# Patient Record
Sex: MALE | URBAN - METROPOLITAN AREA
[De-identification: names, ages, dates, MRNs, and addresses within clinical notes are randomized per-mention and may not be internally consistent; named-entity substitution may affect disease eponyms.]

---

## 2023-10-18 ENCOUNTER — ATHLETIC TRAINING (OUTPATIENT)
Dept: SPORTS MEDICINE | Facility: OTHER | Age: 18
End: 2023-10-18

## 2023-10-18 DIAGNOSIS — S76.311A STRAIN OF RIGHT HAMSTRING MUSCLE, INITIAL ENCOUNTER: Primary | ICD-10-CM

## 2023-10-19 ENCOUNTER — ATHLETIC TRAINING (OUTPATIENT)
Dept: SPORTS MEDICINE | Facility: OTHER | Age: 18
End: 2023-10-19

## 2023-10-19 DIAGNOSIS — S76.311A STRAIN OF RIGHT HAMSTRING MUSCLE, INITIAL ENCOUNTER: Primary | ICD-10-CM

## 2023-10-19 NOTE — PROGRESS NOTES
Athletic Training Hip/Thigh Evaluation     Name: Snehal Vieira  Age: 19 y.o.   Mady Picking: Nhan   Sport: Baseball  Date of Assessment: 10/18/2023     Assessment/Plan:      Visit Diagnosis: Strain of right hamstring muscle, initial encounter [H32.017O]     Treatment Plan: Patient will undergo rehabilitation of R upper leg for hamstring strain. Patient will report to  daily for rehabilitation. Patient will be withheld from baseball activities until further notice (at least 1-2 weeks). []  Follow-up PRN. []  Follow-up prior to next practice/game for re-evaluation. [x]  Daily treatment/rehab. Progress note expected weekly. Referral:      [x]  Not needed at this time  []  Referred to:      [x]  Coaching staff notified  []  Parent/Guardian Notified      Subjective:     Date of Injury: 10/17/2023     Injury occurred during:      [x]  Practice  []  Competition  []  Other:      Mechanism: Running    Previous History: Previous hx of hamstring strain to right upper leg last year. Patient did not complete formal rehabilitation/receive evaluation from  at previous college, simply "dealt" with the pain during his previous baseball season.       Reported Symptoms:      [x] Pain with rest [] Pressure   [x] Pain with activity [] Burning   [x] Pain with stairs [] Weakness   [] Sharp pain [] Loss of motion   [x] Dull pain [] Clicking   [x] Felt pop [] Snapping sensation   [] Felt give way [] Radiating pain   [] Grinding          Objective:    Observation:      []  No observable findings compared bilaterally     [x] Swelling [] Genu recurvatum   [] Deformity [] Genu valgum   [] Ecchymosis [] Genu varus   [] Abnormal gait [] Hip anteversion   [] Atrophy [] Hip retroversion   [] Muscle spasm [] Patella abnormality   [] Spine curvature          Palpation: TTP along muscle belly of biceps femoris      Active Range of Motion:        Full  ROM Limited  ROM Pain  with  ROM No  Motion   Hip Flexion  [x] [] [] []   Hip Extension [] [x] [x] []   Hip Abduction [x] [] [] []   Hip Adduction [x] [] [] []   Hip Internal Rotation [x] [] [] []   Hip External Rotation [x] [] [] []   Knee Flexion [x] [] [x] []   Knee Extension [x] [] [] []      Manual Muscle Tests:      Not performed []                     5 4+ 4 4- 3 or  Under   Hip Flexion  [] [x] [] [] []   Hip Extension [] [] [] [x] []   Hip Abduction [] [x] [] [] []   Hip Adduction [] [x] [] [] []   Hip Internal Rotation [x] [] [] [] []   Hip External Rotation [] [x] [] [] []   Knee Flexion [] [] [x] [] []   Knee Extension [x] [] [] [] []      Special Tests:        (+)  Tightness (+)  Pain (-)  WNL Not  Tested   Fulcrum [] [] [x] []   Ely’s [] [] [x] []   Eudelia Alegre [] [] [x] []   Kanu (Modified Denysdelia Alegre) [] [] [x] []   [place] []  [] [x] []   Piriformis [] [] [x] []   MACIEJ [] [] [x] []   FADIR [] [] [x] []   SI Compression/Distraction [] [] [x] []     (+)  Clicking (+)  Pain (-)  WNL Not  Tested   Hip Scour [] [] [x] []     (+)  POS   (-)  WNL Not  Tested   Long Sit Test [] Leg  Discrepancy [x] []   Trendelenberg's  [] Pelvic  Drop [x] []       Treatment Log:       Date:  10/19   Playing Status:  OUT          Exercise/Treatment      AROM Knee flexion (prone) w/ cupping 2x10 (w/ 2lb CW)    SLR  2x10 (w/ 2lb CW)    Prone hip extension --    Isometric hip extension (into wall) x10   Sidelying hip abduction 2x10    sidelying hip adduction 2x10    3-way sliders  2x10 (flexion, extension, and abduction)    nerve glide (supine & sitting) 2x10

## 2023-10-19 NOTE — PROGRESS NOTES
Athletic Training Progress Note    Name: Margo Hernandez  Age: 19 y.o. Assessment/Plan:     Visit Diagnosis: No primary diagnosis found. Treatment Plan:     []  Follow-up PRN. []  Follow-up prior to next practice/game for re-evaluation. [x]  Daily treatment/rehab. Progress note expected weekly. Subjective: Patient reports that his pain in R upper leg has decreased in comparison to last visit. Initially, he rated his pain a 6/10 at worst and reported he had difficulty falling asleep due to his pain. After completion of rehabilitation yesterday, he reported his pain decreased to a 5/10 and was able to "walk better" than he did before reporting to [facility]. He also reported he was able to fall asleep without difficulties.      Objective:   See treatment log below    Treatment Log:     Date: 10/19       Playing Status: OUT                Exercise/Treatment        AROM Knee flexion w/ cupping (prone) 2x10       SLR  2x10 (w/ 2lbs)       Prone hip extension  X5 (body weight)       Isometric hip extension  2x10        Sidelying hip abduction  2x10 (w/ 2lbs)       Sidelying hip adduction 2x10 (w/ 2lbs)       Nerve glide (sitting/supine) 2x10       Standing nerve glide (w/ trunk flexion) 2x10       3-way sliders  2x10 (flexion, extension, abduction)       Glute Bridge w/ heels up x10                 Date:  10/19   Playing Status:  OUT          Exercise/Treatment      AROM Knee flexion (prone) w/ cupping 2x10 (w/ 2lb CW)    SLR  2x10 (w/ 2lb CW)    Prone hip extension --    Isometric hip extension (into wall) x10   Sidelying hip abduction 2x10    sidelying hip adduction 2x10    3-way sliders  2x10 (flexion, extension, and abduction)    nerve glide (supine & sitting) 2x10

## 2023-10-20 ENCOUNTER — ATHLETIC TRAINING (OUTPATIENT)
Dept: SPORTS MEDICINE | Facility: OTHER | Age: 18
End: 2023-10-20

## 2023-10-20 DIAGNOSIS — S76.311A STRAIN OF RIGHT HAMSTRING MUSCLE, INITIAL ENCOUNTER: Primary | ICD-10-CM

## 2023-10-20 NOTE — PROGRESS NOTES
Athletic Training Progress Note    Name: Dionisio Hugo  Age: 19 y.o. Assessment/Plan:     Visit Diagnosis: Strain of right hamstring muscle, initial encounter [V24.818F]    Treatment Plan: Patient will continue to progress rehabilitative exercises as tolerated. []  Follow-up PRN. []  Follow-up prior to next practice/game for re-evaluation. [x]  Daily treatment/rehab. Progress note expected weekly. Subjective: Patient reported to  for rehabilitation/injury check in. Patient reports his pain in his upper leg as decreased to a 4/10 at worst and reports that it is easier to walk in comparison to yesterday. Patient is eager in continuing rehabilitative process.      Objective:   See treatment log below    Treatment Log:    Date: 10/20       Playing Status: OUT               Exercise/Treatment        AROM Kne flexion w/ cupping 3x10 (3lbs)       Nerve Glide (supine, sitting, standing) 3x10       Hip Hinge stretch w/ towel behind back  NV        SLR w/ 2lbs 2x10       Sidelying hip abduction  2x10       Sidelying hip adduction  2x10       Prone hip extensions x10       Isometric hip extensions  2x10       3-way sliders 2x10       Glute bridge w/ heels up  x10                 Date: 10/19       Playing Status: OUT                Exercise/Treatment        AROM Knee flexion w/ cupping (prone) 2x10       SLR  2x10 (w/ 2lbs)       Prone hip extension  X5 (body weight)       Isometric hip extension  2x10        Sidelying hip abduction  2x10 (w/ 2lbs)       Sidelying hip adduction 2x10 (w/ 2lbs)       Nerve glide (sitting/supine) 2x10       Standing nerve glide (w/ trunk flexion) 2x10       3-way sliders  2x10 (flexion, extension, abduction)       Glute Bridge w/ heels up x10                 Date:  10/19   Playing Status:  OUT          Exercise/Treatment      AROM Knee flexion (prone) w/ cupping 2x10 (w/ 2lb CW)    SLR  2x10 (w/ 2lb CW)    Prone hip extension --    Isometric hip extension (into wall) x10   Sidelying hip abduction 2x10    sidelying hip adduction 2x10    3-way sliders  2x10 (flexion, extension, and abduction)    nerve glide (supine & sitting) 2x10

## 2023-10-23 ENCOUNTER — ATHLETIC TRAINING (OUTPATIENT)
Dept: SPORTS MEDICINE | Facility: OTHER | Age: 18
End: 2023-10-23

## 2023-10-23 DIAGNOSIS — S76.311A STRAIN OF RIGHT HAMSTRING MUSCLE, INITIAL ENCOUNTER: Primary | ICD-10-CM

## 2023-10-23 NOTE — PROGRESS NOTES
Athletic Training Progress Note    Name: Vitor Yap  Age: 19 y.o. Assessment/Plan:     Visit Diagnosis: Strain of right hamstring muscle, initial encounter [S71.085M]    Treatment Plan: Progress exercises as tolerated. []  Follow-up PRN. []  Follow-up prior to next practice/game for re-evaluation. [x]  Daily treatment/rehab. Progress note expected weekly. Subjective: Patient reported to  for check in & rehabilitation of R hamstring. Patient reports a slight decrease in pain in upper leg in comparison to last visit. He reports that pain at worst is a 4/10, at best 1/10 while at rest. He also reports that walking sometimes feels "weird" as it will feel "good" sometimes but then he he takes a step too far, he'll feel his R hamstring "cramp". Patient is eager to continue rehabilitative process.      Objective:   See treatment log below    Treatment Log:    Date: 10/23       Playing Status: OUT                Exercise/Treatment        Glute bridge w/ toes up 2x10       DL RDL w/ 10lbs  x10       Hamstring curl w/ yoga ball x10       Nerve glides (supine, sitting, standing)  3x10       3-way sliders 3x10        4-way hip w/ cable  2x10 (flexion, extension, abduction, adduction)        Seated hamstring curl  X10 (10lbs)                                          Date: 10/19       Playing Status: OUT                Exercise/Treatment        AROM Knee flexion w/ cupping (prone) 2x10       SLR  2x10 (w/ 2lbs)       Prone hip extension  X5 (body weight)       Isometric hip extension  2x10        Sidelying hip abduction  2x10 (w/ 2lbs)       Sidelying hip adduction 2x10 (w/ 2lbs)       Nerve glide (sitting/supine) 2x10       Standing nerve glide (w/ trunk flexion) 2x10       3-way sliders  2x10 (flexion, extension, abduction)       Glute Bridge w/ heels up x10                 Date:  10/19   Playing Status:  OUT          Exercise/Treatment      AROM Knee flexion (prone) w/ cupping 2x10 (w/ 2lb CW)    SLR  2x10 (w/ 2lb CW)    Prone hip extension --    Isometric hip extension (into wall) x10   Sidelying hip abduction 2x10    sidelying hip adduction 2x10    3-way sliders  2x10 (flexion, extension, and abduction)    nerve glide (supine & sitting) 2x10

## 2023-10-24 ENCOUNTER — ATHLETIC TRAINING (OUTPATIENT)
Dept: SPORTS MEDICINE | Facility: OTHER | Age: 18
End: 2023-10-24

## 2023-10-24 DIAGNOSIS — S76.311A STRAIN OF RIGHT HAMSTRING MUSCLE, INITIAL ENCOUNTER: Primary | ICD-10-CM

## 2023-10-25 ENCOUNTER — ATHLETIC TRAINING (OUTPATIENT)
Dept: SPORTS MEDICINE | Facility: OTHER | Age: 18
End: 2023-10-25

## 2023-10-25 DIAGNOSIS — S76.311A STRAIN OF RIGHT HAMSTRING MUSCLE, INITIAL ENCOUNTER: Primary | ICD-10-CM

## 2023-10-25 NOTE — PROGRESS NOTES
Athletic Training Progress Note    Name: Darius Mark  Age: 19 y.o. Assessment/Plan:     Visit Diagnosis: Strain of right hamstring muscle, initial encounter [K33.848U]    Treatment Plan: Patient will progress exercises as tolerated. Patient will report to  10/25 for mobility work only. []  Follow-up PRN. []  Follow-up prior to next practice/game for re-evaluation. [x]  Daily treatment/rehab. Progress note expected weekly. Subjective: Patient reported to  for rehabilitation and injury check in for R hamstring strain. Patient reports his pain feeling the "same" as yesterday.  However, he noted that the completion of his rehabilitative exercises, he felt "better" and noted his pain decreased to a 3/10 at worst.     Objective:   See treatment log below    Treatment Log:    Date: 10/25       Playing Status: OUT                Exercise/Treatment        Glute Bridge w/ toes up  x10       DL RDL  x10       Hamstring curl w/ yoga ball x10       3-way sliders  3x10       4-way hip (on cable machine) 2x10        Seated hamstring curl machine (cable)  X10 (10lbs)        Nerve Glides (supine, sitting, standing) 3x10       Hip Hinge w/ towel stretch X10                                  Date: 10/19       Playing Status: OUT                Exercise/Treatment        AROM Knee flexion w/ cupping (prone) 2x10       SLR  2x10 (w/ 2lbs)       Prone hip extension  X5 (body weight)       Isometric hip extension  2x10        Sidelying hip abduction  2x10 (w/ 2lbs)       Sidelying hip adduction 2x10 (w/ 2lbs)       Nerve glide (sitting/supine) 2x10       Standing nerve glide (w/ trunk flexion) 2x10       3-way sliders  2x10 (flexion, extension, abduction)       Glute Bridge w/ heels up x10                 Date:  10/19   Playing Status:  OUT          Exercise/Treatment      AROM Knee flexion (prone) w/ cupping 2x10 (w/ 2lb CW)    SLR  2x10 (w/ 2lb CW)    Prone hip extension --    Isometric hip extension (into wall) x10   Sidelying hip abduction 2x10    sidelying hip adduction 2x10    3-way sliders  2x10 (flexion, extension, and abduction)    nerve glide (supine & sitting) 2x10

## 2023-10-25 NOTE — PROGRESS NOTES
Athletic Training Progress Note    Name: Jf Zafar  Age: 19 y.o. Assessment/Plan:     Visit Diagnosis: Strain of right hamstring muscle, initial encounter [O58.131O]    Treatment Plan: Patient will progress exercises as tolerated. Patient will be re-evaluated 10/26 to determine next steps in treatment. []  Follow-up PRN. []  Follow-up prior to next practice/game for re-evaluation. [x]  Daily treatment/rehab. Progress note expected weekly. Subjective: Patient reported to  for rehabilitation and injury check in. Patient reports that "this is the best he's felt" since his injury.      Objective:   See treatment log below    Treatment Log:    Date: 10/25       Playing Status: OUT- re-eval 10/30               Exercise/Treatment        Nerve Glides (supine, standing, sitting) 3x10       Hip Hinge w/ towel stretch 3x10       AROM knee flexion w/ cupping 3x10                                                                         Date: 10/19       Playing Status: OUT                Exercise/Treatment        AROM Knee flexion w/ cupping (prone) 2x10       SLR  2x10 (w/ 2lbs)       Prone hip extension  X5 (body weight)       Isometric hip extension  2x10        Sidelying hip abduction  2x10 (w/ 2lbs)       Sidelying hip adduction 2x10 (w/ 2lbs)       Nerve glide (sitting/supine) 2x10       Standing nerve glide (w/ trunk flexion) 2x10       3-way sliders  2x10 (flexion, extension, abduction)       Glute Bridge w/ heels up x10                 Date:  10/19   Playing Status:  OUT          Exercise/Treatment      AROM Knee flexion (prone) w/ cupping 2x10 (w/ 2lb CW)    SLR  2x10 (w/ 2lb CW)    Prone hip extension --    Isometric hip extension (into wall) x10   Sidelying hip abduction 2x10    sidelying hip adduction 2x10    3-way sliders  2x10 (flexion, extension, and abduction)    nerve glide (supine & sitting) 2x10

## 2023-11-08 ENCOUNTER — APPOINTMENT (OUTPATIENT)
Dept: LAB | Age: 18
End: 2023-11-08
Payer: COMMERCIAL

## 2023-11-08 DIAGNOSIS — J02.9 ACUTE PHARYNGITIS, UNSPECIFIED ETIOLOGY: ICD-10-CM

## 2023-11-08 LAB
ALBUMIN SERPL BCP-MCNC: 4.3 G/DL (ref 3.5–5)
ALP SERPL-CCNC: 60 U/L (ref 34–104)
ALT SERPL W P-5'-P-CCNC: 17 U/L (ref 7–52)
ANION GAP SERPL CALCULATED.3IONS-SCNC: 8 MMOL/L
AST SERPL W P-5'-P-CCNC: 18 U/L (ref 13–39)
BASOPHILS # BLD AUTO: 0.04 THOUSANDS/ÂΜL (ref 0–0.1)
BASOPHILS NFR BLD AUTO: 1 % (ref 0–1)
BILIRUB SERPL-MCNC: 0.5 MG/DL (ref 0.2–1)
BUN SERPL-MCNC: 11 MG/DL (ref 5–25)
CALCIUM SERPL-MCNC: 9.4 MG/DL (ref 8.4–10.2)
CHLORIDE SERPL-SCNC: 101 MMOL/L (ref 96–108)
CO2 SERPL-SCNC: 29 MMOL/L (ref 21–32)
CREAT SERPL-MCNC: 0.88 MG/DL (ref 0.6–1.3)
EOSINOPHIL # BLD AUTO: 0.07 THOUSAND/ÂΜL (ref 0–0.61)
EOSINOPHIL NFR BLD AUTO: 1 % (ref 0–6)
ERYTHROCYTE [DISTWIDTH] IN BLOOD BY AUTOMATED COUNT: 12.3 % (ref 11.6–15.1)
GFR SERPL CREATININE-BSD FRML MDRD: 123 ML/MIN/1.73SQ M
GLUCOSE SERPL-MCNC: 97 MG/DL (ref 65–140)
HCT VFR BLD AUTO: 43.7 % (ref 36.5–49.3)
HGB BLD-MCNC: 14.9 G/DL (ref 12–17)
IMM GRANULOCYTES # BLD AUTO: 0.02 THOUSAND/UL (ref 0–0.2)
IMM GRANULOCYTES NFR BLD AUTO: 0 % (ref 0–2)
LYMPHOCYTES # BLD AUTO: 1.24 THOUSANDS/ÂΜL (ref 0.6–4.47)
LYMPHOCYTES NFR BLD AUTO: 18 % (ref 14–44)
MCH RBC QN AUTO: 30.8 PG (ref 26.8–34.3)
MCHC RBC AUTO-ENTMCNC: 34.1 G/DL (ref 31.4–37.4)
MCV RBC AUTO: 90 FL (ref 82–98)
MONOCYTES # BLD AUTO: 0.74 THOUSAND/ÂΜL (ref 0.17–1.22)
MONOCYTES NFR BLD AUTO: 11 % (ref 4–12)
NEUTROPHILS # BLD AUTO: 4.8 THOUSANDS/ÂΜL (ref 1.85–7.62)
NEUTS SEG NFR BLD AUTO: 69 % (ref 43–75)
NRBC BLD AUTO-RTO: 0 /100 WBCS
PLATELET # BLD AUTO: 206 THOUSANDS/UL (ref 149–390)
PMV BLD AUTO: 10 FL (ref 8.9–12.7)
POTASSIUM SERPL-SCNC: 4 MMOL/L (ref 3.5–5.3)
PROT SERPL-MCNC: 7.1 G/DL (ref 6.4–8.4)
RBC # BLD AUTO: 4.84 MILLION/UL (ref 3.88–5.62)
SODIUM SERPL-SCNC: 138 MMOL/L (ref 135–147)
WBC # BLD AUTO: 6.91 THOUSAND/UL (ref 4.31–10.16)

## 2023-11-08 PROCEDURE — 85025 COMPLETE CBC W/AUTO DIFF WBC: CPT

## 2023-11-08 PROCEDURE — 36415 COLL VENOUS BLD VENIPUNCTURE: CPT

## 2023-11-08 PROCEDURE — 86664 EPSTEIN-BARR NUCLEAR ANTIGEN: CPT

## 2023-11-08 PROCEDURE — 80053 COMPREHEN METABOLIC PANEL: CPT

## 2023-11-08 PROCEDURE — 86665 EPSTEIN-BARR CAPSID VCA: CPT

## 2023-11-08 PROCEDURE — 87070 CULTURE OTHR SPECIMN AEROBIC: CPT | Performed by: PHYSICIAN ASSISTANT

## 2023-11-08 PROCEDURE — 86663 EPSTEIN-BARR ANTIBODY: CPT

## 2023-11-09 ENCOUNTER — LAB REQUISITION (OUTPATIENT)
Dept: LAB | Facility: HOSPITAL | Age: 18
End: 2023-11-09
Payer: COMMERCIAL

## 2023-11-09 DIAGNOSIS — J02.9 ACUTE PHARYNGITIS, UNSPECIFIED: ICD-10-CM

## 2023-11-09 LAB
EBV NA IGG SER IA-ACNC: 29.9 U/ML (ref 0–17.9)
EBV VCA IGG SER IA-ACNC: 433 U/ML (ref 0–17.9)
EBV VCA IGM SER IA-ACNC: <36 U/ML (ref 0–35.9)
INTERPRETATION: ABNORMAL

## 2023-11-12 LAB — BACTERIA THROAT CULT: NORMAL
